# Patient Record
Sex: FEMALE | NOT HISPANIC OR LATINO | URBAN - METROPOLITAN AREA
[De-identification: names, ages, dates, MRNs, and addresses within clinical notes are randomized per-mention and may not be internally consistent; named-entity substitution may affect disease eponyms.]

---
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
## 2022-04-02 ASSESSMENT — TONOMETRY
OD_IOP_MMHG: 14
OS_IOP_MMHG: 14

## 2022-04-02 ASSESSMENT — VISUAL ACUITY
OD_CC: 20/25-2
OS_CC: 20/25-2

## 2023-05-04 NOTE — PATIENT DISCUSSION
Continue Restasis BID OU and genteal gel drops QHS OS.  especially while prolonged reading.  Monitor for changes. Advised patient to call our office with decreased vision or increased symptoms.  RTC 1 year Exam. See e-advice 5/2/2023